# Patient Record
Sex: FEMALE | Race: WHITE | NOT HISPANIC OR LATINO | URBAN - METROPOLITAN AREA
[De-identification: names, ages, dates, MRNs, and addresses within clinical notes are randomized per-mention and may not be internally consistent; named-entity substitution may affect disease eponyms.]

---

## 2017-11-02 ENCOUNTER — IMPORTED ENCOUNTER (OUTPATIENT)
Dept: URBAN - METROPOLITAN AREA CLINIC 31 | Facility: CLINIC | Age: 82
End: 2017-11-02

## 2017-11-02 PROBLEM — H04.123: Noted: 2017-11-02

## 2017-11-02 PROBLEM — Z96.1: Noted: 2017-11-02

## 2017-11-02 PROBLEM — H35.3122: Noted: 2017-11-02

## 2017-11-02 PROCEDURE — 92014 COMPRE OPH EXAM EST PT 1/>: CPT

## 2017-11-02 NOTE — PATIENT DISCUSSION
1.  Dry Eyes OU:  Start artificials tears. Encouraged regular use. 2.  Pseudophakia OU - IOLs stable. Monitor. 3. ARMD OS dry - Importance of smoking cessation blood pressure control and healthy diet were emphasized. In accordance with the AREDS study a good multivitamin containing EC and Zinc were recommened to be taken daily. Patient was instructed to self monitor their monocular vision (reading/Amsler Grid) at least weekly. Patient should immediately report any new onset of decreased vision or metamorphopsia. 4. Return for an appointment in 12 months for comprehensive exam. with Dr. Emeli Forrester.

## 2018-05-09 ENCOUNTER — IMPORTED ENCOUNTER (OUTPATIENT)
Dept: URBAN - METROPOLITAN AREA CLINIC 31 | Facility: CLINIC | Age: 83
End: 2018-05-09

## 2018-05-09 PROBLEM — Z96.1: Noted: 2018-05-09

## 2018-05-09 PROBLEM — H43.392: Noted: 2018-05-09

## 2018-05-09 PROBLEM — H04.123: Noted: 2018-05-09

## 2018-05-09 PROBLEM — H35.3122: Noted: 2018-05-09

## 2018-05-09 PROCEDURE — 99214 OFFICE O/P EST MOD 30 MIN: CPT

## 2018-05-09 NOTE — PATIENT DISCUSSION
1.  Dry Eyes OU:  Start artificials tears. Encouraged regular use. 2.  Pseudophakia OU - IOLs stable. Monitor. 3. ARMD OS dry - Importance of smoking cessation blood pressure control and healthy diet were emphasized. In accordance with the AREDS study a good multivitamin containing EC and Zinc were recommened to be taken daily. Patient was instructed to self monitor their monocular vision (reading/Amsler Grid) at least weekly. Patient should immediately report any new onset of decreased vision or metamorphopsia. 4. Return for an appointment in 12 months for comprehensive exam. with Dr. Moreno Sibley.

## 2018-05-09 NOTE — PATIENT DISCUSSION
1.  Floaters OS:   Pt getting the flashes. None in past 1 week. Patient was cautioned to call our office immediately if they experience a substantial change in their symptoms such as an increase in floaters persistent flashes loss of visual field (may appear as a shadow or a curtain) or decrease in visual acuity as these may indicate a retinal tear or detachment. 2.  Dry Eyes OU:  Start artificials tears at least 2x per day or more. Pt drier since her chemo in 9/17. Encouraged regular use. 3.  Pseudophakia OU - IOLs stable. Monitor. 4. ARMD OS dry -   No smoking use vits UV suns and amsler5.   RTN 11/18 CE

## 2018-11-08 ENCOUNTER — IMPORTED ENCOUNTER (OUTPATIENT)
Dept: URBAN - METROPOLITAN AREA CLINIC 31 | Facility: CLINIC | Age: 83
End: 2018-11-08

## 2018-11-08 PROBLEM — H35.3122: Noted: 2018-11-08

## 2018-11-08 PROBLEM — Z96.1: Noted: 2018-11-08

## 2018-11-08 PROBLEM — H04.123: Noted: 2018-11-08

## 2018-11-08 PROBLEM — H43.812: Noted: 2018-11-08

## 2018-11-08 PROCEDURE — 92014 COMPRE OPH EXAM EST PT 1/>: CPT

## 2018-11-08 NOTE — PATIENT DISCUSSION
1.  ARMD OS dry - Importance of smoking cessation blood pressure control and healthy diet were emphasized. In accordance with the AREDS study a good multivitamin containing EC and Zinc were recommened to be taken daily. Patient was instructed to self monitor their monocular vision (reading/Amsler Grid) at least weekly. Patient should immediately report any new onset of decreased vision or metamorphopsia. 2. PVD OS: Patient was cautioned to call our office immediately if they experience a substantial change in their symptoms such as an increase in floaters persistent flashes loss of visual field (may appear as a shadow or a curtain) or decrease in visual acuity as these may indicate a retinal tear or detachment. If this is a new problem patient will need to return for re-examination  as determined by the [address]. Pseudophakia OU - IOLs stable. Monitor. 4. Dry Eye OU:  Continue current management with Artificial Tears. 5.  Return for an appointment in 12 months for comprehensive exam. with Dr. Jeremy Jensen.

## 2022-04-02 ASSESSMENT — TONOMETRY
OS_IOP_MMHG: 17
OD_IOP_MMHG: 15
OS_IOP_MMHG: 14
OD_IOP_MMHG: 13
OD_IOP_MMHG: 14
OS_IOP_MMHG: 14

## 2022-04-02 ASSESSMENT — VISUAL ACUITY
OS_CC: 20/40+2
OD_CC: 20/40+2
OS_CC: 20/40+1
OD_CC: 20/30-2